# Patient Record
Sex: MALE | Race: BLACK OR AFRICAN AMERICAN | NOT HISPANIC OR LATINO | Employment: OTHER | URBAN - METROPOLITAN AREA
[De-identification: names, ages, dates, MRNs, and addresses within clinical notes are randomized per-mention and may not be internally consistent; named-entity substitution may affect disease eponyms.]

---

## 2019-07-17 PROBLEM — Z09 POSTOP CHECK: Status: ACTIVE | Noted: 2019-07-17

## 2019-10-21 PROBLEM — Z09 POSTOP CHECK: Status: RESOLVED | Noted: 2019-07-17 | Resolved: 2019-10-21

## 2021-07-12 ENCOUNTER — OFFICE VISIT (OUTPATIENT)
Dept: SURGERY | Facility: CLINIC | Age: 56
End: 2021-07-12
Payer: MEDICARE

## 2021-07-12 VITALS — HEIGHT: 75 IN | WEIGHT: 193 LBS | RESPIRATION RATE: 18 BRPM | BODY MASS INDEX: 24 KG/M2

## 2021-07-12 DIAGNOSIS — K43.0 RECURRENT INCISIONAL HERNIA WITH INCARCERATION: ICD-10-CM

## 2021-07-12 DIAGNOSIS — Z01.818 PRE-OP EVALUATION: Primary | ICD-10-CM

## 2021-07-12 PROCEDURE — 3008F BODY MASS INDEX DOCD: CPT | Mod: CPTII,S$GLB,, | Performed by: SURGERY

## 2021-07-12 PROCEDURE — 1125F PR PAIN SEVERITY QUANTIFIED, PAIN PRESENT: ICD-10-PCS | Mod: S$GLB,,, | Performed by: SURGERY

## 2021-07-12 PROCEDURE — 3008F PR BODY MASS INDEX (BMI) DOCUMENTED: ICD-10-PCS | Mod: CPTII,S$GLB,, | Performed by: SURGERY

## 2021-07-12 PROCEDURE — 1125F AMNT PAIN NOTED PAIN PRSNT: CPT | Mod: S$GLB,,, | Performed by: SURGERY

## 2021-07-12 PROCEDURE — 99214 OFFICE O/P EST MOD 30 MIN: CPT | Mod: S$GLB,,, | Performed by: SURGERY

## 2021-07-12 PROCEDURE — 99214 PR OFFICE/OUTPT VISIT, EST, LEVL IV, 30-39 MIN: ICD-10-PCS | Mod: S$GLB,,, | Performed by: SURGERY

## 2021-07-12 RX ORDER — HYDROCODONE BITARTRATE AND ACETAMINOPHEN 10; 325 MG/1; MG/1
TABLET ORAL
COMMUNITY
Start: 2021-07-03 | End: 2023-10-18

## 2021-08-04 ENCOUNTER — TELEPHONE (OUTPATIENT)
Dept: SURGERY | Facility: CLINIC | Age: 56
End: 2021-08-04

## 2021-09-10 PROBLEM — Z90.49 S/P PROCTOCOLECTOMY: Status: ACTIVE | Noted: 2021-09-10

## 2021-09-10 PROBLEM — K90.9 DIARRHEA DUE TO MALABSORPTION: Status: ACTIVE | Noted: 2021-09-10

## 2021-09-10 PROBLEM — R19.7 DIARRHEA DUE TO MALABSORPTION: Status: ACTIVE | Noted: 2021-09-10

## 2021-09-10 PROBLEM — Z87.19 HISTORY OF ULCERATIVE COLITIS: Status: ACTIVE | Noted: 2021-09-10

## 2021-10-12 ENCOUNTER — TELEPHONE (OUTPATIENT)
Dept: SURGERY | Facility: CLINIC | Age: 56
End: 2021-10-12

## 2021-11-11 ENCOUNTER — OFFICE VISIT (OUTPATIENT)
Dept: SURGERY | Facility: CLINIC | Age: 56
End: 2021-11-11
Payer: MEDICARE

## 2021-11-11 VITALS — HEIGHT: 75 IN | WEIGHT: 202 LBS | BODY MASS INDEX: 25.12 KG/M2 | RESPIRATION RATE: 18 BRPM

## 2021-11-11 DIAGNOSIS — K43.0 RECURRENT INCISIONAL HERNIA WITH INCARCERATION: Primary | ICD-10-CM

## 2021-11-11 PROCEDURE — 99213 PR OFFICE/OUTPT VISIT, EST, LEVL III, 20-29 MIN: ICD-10-PCS | Mod: S$GLB,,, | Performed by: SURGERY

## 2021-11-11 PROCEDURE — 3008F PR BODY MASS INDEX (BMI) DOCUMENTED: ICD-10-PCS | Mod: CPTII,S$GLB,, | Performed by: SURGERY

## 2021-11-11 PROCEDURE — 1160F RVW MEDS BY RX/DR IN RCRD: CPT | Mod: CPTII,S$GLB,, | Performed by: SURGERY

## 2021-11-11 PROCEDURE — 99213 OFFICE O/P EST LOW 20 MIN: CPT | Mod: S$GLB,,, | Performed by: SURGERY

## 2021-11-11 PROCEDURE — 1159F PR MEDICATION LIST DOCUMENTED IN MEDICAL RECORD: ICD-10-PCS | Mod: CPTII,S$GLB,, | Performed by: SURGERY

## 2021-11-11 PROCEDURE — 3008F BODY MASS INDEX DOCD: CPT | Mod: CPTII,S$GLB,, | Performed by: SURGERY

## 2021-11-11 PROCEDURE — 1159F MED LIST DOCD IN RCRD: CPT | Mod: CPTII,S$GLB,, | Performed by: SURGERY

## 2021-11-11 PROCEDURE — 1160F PR REVIEW ALL MEDS BY PRESCRIBER/CLIN PHARMACIST DOCUMENTED: ICD-10-PCS | Mod: CPTII,S$GLB,, | Performed by: SURGERY

## 2021-11-16 ENCOUNTER — OUTSIDE PLACE OF SERVICE (OUTPATIENT)
Dept: SURGERY | Facility: CLINIC | Age: 56
End: 2021-11-16
Payer: MEDICARE

## 2021-11-16 PROCEDURE — 49652 PR LAP VENTRAL/UMBILICAL HERNIA; REDUCIBLE: ICD-10-PCS | Mod: ,,, | Performed by: SURGERY

## 2021-11-16 PROCEDURE — 49652 PR LAP VENTRAL/UMBILICAL HERNIA; REDUCIBLE: CPT | Mod: ,,, | Performed by: SURGERY

## 2021-11-17 LAB
ABS NRBC COUNT: 0 THOU/UL (ref 0–0.01)
ABSOLUTE BASOPHIL: 0 10*3/UL (ref 0–0.3)
ABSOLUTE EOSINOPHIL: 0.3 10*3/UL (ref 0–0.6)
ABSOLUTE IMMATURE GRAN: 0.02 THOU/UL (ref 0–0.03)
ABSOLUTE LYMPHOCYTE: 1.4 10*3/UL (ref 1.2–4)
ABSOLUTE MONOCYTE: 0.5 10*3/UL (ref 0.1–0.8)
BASOPHILS NFR BLD: 0.3 % (ref 0–3)
EOSINOPHIL NFR BLD: 4.9 % (ref 0–6)
ERYTHROCYTE [DISTWIDTH] IN BLOOD BY AUTOMATED COUNT: 13.9 % (ref 0–15.5)
HCT VFR BLD AUTO: 38.7 % (ref 42–52)
HGB BLD-MCNC: 12.9 G/DL (ref 14–18)
IMMATURE GRANULOCYTES: 0.3 % (ref 0–0.43)
LYMPHOCYTES NFR BLD: 21.5 % (ref 20–45)
MCH RBC QN AUTO: 32.7 PG (ref 27–32)
MCHC RBC AUTO-ENTMCNC: 33.3 % (ref 32–36)
MCV RBC AUTO: 98 FL (ref 80–97)
MONOCYTES NFR BLD: 8.2 % (ref 2–10)
NEUTROPHILS # BLD AUTO: 4.1 10*3/UL (ref 1.4–7)
NEUTROPHILS NFR BLD: 64.8 % (ref 50–80)
NUCLEATED RED BLOOD CELLS: 0 % (ref 0–0.2)
PLATELETS: 153 10*3/UL (ref 130–400)
PMV BLD AUTO: 11.6 FL (ref 9.2–12.2)
RBC # BLD AUTO: 3.95 10*6/UL (ref 4.7–6.1)
WBC # BLD: 6.3 10*3/UL (ref 4.5–10)

## 2021-11-23 ENCOUNTER — TELEPHONE (OUTPATIENT)
Dept: SURGERY | Facility: CLINIC | Age: 56
End: 2021-11-23
Payer: MEDICARE

## 2021-11-29 ENCOUNTER — OFFICE VISIT (OUTPATIENT)
Dept: SURGERY | Facility: CLINIC | Age: 56
End: 2021-11-29
Payer: MEDICARE

## 2021-11-29 DIAGNOSIS — R10.9 ABDOMINAL PAIN, UNSPECIFIED ABDOMINAL LOCATION: ICD-10-CM

## 2021-11-29 DIAGNOSIS — K43.0 RECURRENT INCISIONAL HERNIA WITH INCARCERATION: Primary | ICD-10-CM

## 2021-11-29 PROCEDURE — 99024 PR POST-OP FOLLOW-UP VISIT: ICD-10-PCS | Mod: S$GLB,,, | Performed by: SURGERY

## 2021-11-29 PROCEDURE — 99024 POSTOP FOLLOW-UP VISIT: CPT | Mod: S$GLB,,, | Performed by: SURGERY

## 2021-11-29 RX ORDER — OXYCODONE AND ACETAMINOPHEN 5; 325 MG/1; MG/1
1 TABLET ORAL EVERY 4 HOURS PRN
Qty: 30 TABLET | Refills: 0 | Status: CANCELLED | OUTPATIENT
Start: 2021-11-29

## 2021-12-13 ENCOUNTER — OFFICE VISIT (OUTPATIENT)
Dept: SURGERY | Facility: CLINIC | Age: 56
End: 2021-12-13
Payer: MEDICARE

## 2021-12-13 VITALS — BODY MASS INDEX: 24 KG/M2 | RESPIRATION RATE: 18 BRPM | HEIGHT: 75 IN | WEIGHT: 193 LBS

## 2021-12-13 DIAGNOSIS — K43.0 RECURRENT INCISIONAL HERNIA WITH INCARCERATION: Primary | ICD-10-CM

## 2021-12-13 PROCEDURE — 99024 PR POST-OP FOLLOW-UP VISIT: ICD-10-PCS | Mod: S$GLB,,, | Performed by: SURGERY

## 2021-12-13 PROCEDURE — 99024 POSTOP FOLLOW-UP VISIT: CPT | Mod: S$GLB,,, | Performed by: SURGERY

## 2022-03-23 ENCOUNTER — OFFICE VISIT (OUTPATIENT)
Dept: SURGERY | Facility: CLINIC | Age: 57
End: 2022-03-23
Payer: MEDICARE

## 2022-03-23 DIAGNOSIS — R10.9 ABDOMINAL PAIN, UNSPECIFIED ABDOMINAL LOCATION: Primary | ICD-10-CM

## 2022-03-23 PROCEDURE — 1160F RVW MEDS BY RX/DR IN RCRD: CPT | Mod: CPTII,S$GLB,, | Performed by: SURGERY

## 2022-03-23 PROCEDURE — 1159F MED LIST DOCD IN RCRD: CPT | Mod: CPTII,S$GLB,, | Performed by: SURGERY

## 2022-03-23 PROCEDURE — 99213 OFFICE O/P EST LOW 20 MIN: CPT | Mod: S$GLB,,, | Performed by: SURGERY

## 2022-03-23 PROCEDURE — 99213 PR OFFICE/OUTPT VISIT, EST, LEVL III, 20-29 MIN: ICD-10-PCS | Mod: S$GLB,,, | Performed by: SURGERY

## 2022-03-23 PROCEDURE — 1160F PR REVIEW ALL MEDS BY PRESCRIBER/CLIN PHARMACIST DOCUMENTED: ICD-10-PCS | Mod: CPTII,S$GLB,, | Performed by: SURGERY

## 2022-03-23 PROCEDURE — 1159F PR MEDICATION LIST DOCUMENTED IN MEDICAL RECORD: ICD-10-PCS | Mod: CPTII,S$GLB,, | Performed by: SURGERY

## 2022-03-23 RX ORDER — PREGABALIN 150 MG/1
150 CAPSULE ORAL 3 TIMES DAILY
COMMUNITY
Start: 2022-02-28

## 2022-03-23 NOTE — PROGRESS NOTES
Subjective:       Patient ID: Roger Ge is a 57 y.o. male.    Chief Complaint: Abdominal Pain      This is a 57-year-old male who is known to me, patient had a robotic recurrent ventral hernia repair performed approximately 4 months ago returns with generalized abdominal pain.  States he is having pain is periumbilical area near the repair as well in his right and left lower quadrant, also complaining of profuse diarrhea.  Patient states he has been having symptoms for several weeks.    Review of Systems   Constitutional: Negative for chills, fatigue and fever.   HENT: Negative for nasal congestion and rhinorrhea.    Respiratory: Negative for shortness of breath and wheezing.    Cardiovascular: Negative for chest pain and palpitations.   Gastrointestinal: Negative for abdominal pain, blood in stool, diarrhea, nausea and vomiting.   Endocrine: Negative for cold intolerance and heat intolerance.   Genitourinary: Negative for difficulty urinating.   Musculoskeletal: Negative for joint swelling and myalgias.   Integumentary:  Negative for rash and wound.   Neurological: Negative for weakness, light-headedness and numbness.   Psychiatric/Behavioral: Negative for agitation and confusion.         Objective:      Physical Exam  Vitals reviewed.   Constitutional:       Appearance: He is well-developed.   HENT:      Head: Normocephalic and atraumatic.   Eyes:      Conjunctiva/sclera: Conjunctivae normal.   Neck:      Trachea: Trachea normal.   Cardiovascular:      Rate and Rhythm: Normal rate and regular rhythm.   Pulmonary:      Effort: Pulmonary effort is normal.      Breath sounds: Normal breath sounds.   Abdominal:      General: There is no distension.      Palpations: Abdomen is soft.      Tenderness: There is no abdominal tenderness. There is no guarding.      Hernia: No hernia is present.   Musculoskeletal:         General: Normal range of motion.      Cervical back: Normal range of motion.   Skin:     General:  Skin is warm and dry.   Neurological:      Mental Status: He is alert and oriented to person, place, and time.   Psychiatric:         Speech: Speech normal.         Behavior: Behavior normal.         Assessment:       Problem List Items Addressed This Visit    None         Plan:       57-year-old male with generalized abdominal pain, diarrhea, no recurrence noted on physical examination, will order CT scan of the abdomen pelvis to better evaluate possible source of the patient's abdominal pain.

## 2023-04-17 ENCOUNTER — OFFICE VISIT (OUTPATIENT)
Dept: SURGERY | Facility: CLINIC | Age: 58
End: 2023-04-17
Payer: MEDICARE

## 2023-04-17 DIAGNOSIS — R19.7 DIARRHEA, UNSPECIFIED TYPE: ICD-10-CM

## 2023-04-17 DIAGNOSIS — R10.9 ABDOMINAL PAIN, UNSPECIFIED ABDOMINAL LOCATION: Primary | ICD-10-CM

## 2023-04-17 PROCEDURE — 1160F PR REVIEW ALL MEDS BY PRESCRIBER/CLIN PHARMACIST DOCUMENTED: ICD-10-PCS | Mod: CPTII,S$GLB,, | Performed by: SURGERY

## 2023-04-17 PROCEDURE — 99214 PR OFFICE/OUTPT VISIT, EST, LEVL IV, 30-39 MIN: ICD-10-PCS | Mod: S$GLB,,, | Performed by: SURGERY

## 2023-04-17 PROCEDURE — 99214 OFFICE O/P EST MOD 30 MIN: CPT | Mod: S$GLB,,, | Performed by: SURGERY

## 2023-04-17 PROCEDURE — 1159F MED LIST DOCD IN RCRD: CPT | Mod: CPTII,S$GLB,, | Performed by: SURGERY

## 2023-04-17 PROCEDURE — 1160F RVW MEDS BY RX/DR IN RCRD: CPT | Mod: CPTII,S$GLB,, | Performed by: SURGERY

## 2023-04-17 PROCEDURE — 1159F PR MEDICATION LIST DOCUMENTED IN MEDICAL RECORD: ICD-10-PCS | Mod: CPTII,S$GLB,, | Performed by: SURGERY

## 2023-04-17 NOTE — PROGRESS NOTES
Subjective:       Patient ID: Roger Ge is a 58 y.o. male.    Chief Complaint: Abdominal Pain and Diarrhea      Patient is still having generalized abdominal pain and diarrhea, saw the Gastroenterology team and set up for a colonoscopy in the a.m..    Review of Systems   Constitutional:  Negative for chills, fatigue and fever.   HENT:  Negative for nasal congestion and rhinorrhea.    Respiratory:  Negative for shortness of breath and wheezing.    Cardiovascular:  Negative for chest pain and palpitations.   Gastrointestinal:  Negative for abdominal pain, blood in stool, diarrhea, nausea and vomiting.   Endocrine: Negative for cold intolerance and heat intolerance.   Genitourinary:  Negative for difficulty urinating.   Musculoskeletal:  Negative for joint swelling and myalgias.   Integumentary:  Negative for rash and wound.   Neurological:  Negative for weakness, light-headedness and numbness.   Psychiatric/Behavioral:  Negative for agitation and confusion.        Objective:      Physical Exam  Vitals reviewed.   Constitutional:       Appearance: He is well-developed.   HENT:      Head: Normocephalic and atraumatic.   Eyes:      Conjunctiva/sclera: Conjunctivae normal.   Neck:      Trachea: Trachea normal.   Cardiovascular:      Rate and Rhythm: Normal rate and regular rhythm.   Pulmonary:      Effort: Pulmonary effort is normal.      Breath sounds: Normal breath sounds.   Abdominal:      General: There is no distension.      Palpations: Abdomen is soft.      Tenderness: There is no abdominal tenderness. There is no guarding.      Hernia: No hernia is present.   Musculoskeletal:         General: Normal range of motion.      Cervical back: Normal range of motion.   Skin:     General: Skin is warm and dry.   Neurological:      Mental Status: He is alert and oriented to person, place, and time.   Psychiatric:         Speech: Speech normal.         Behavior: Behavior normal.       Assessment:       Problem List Items  Addressed This Visit    None  Visit Diagnoses       Abdominal pain, unspecified abdominal location    -  Primary    Diarrhea, unspecified type                Plan:       Instructed patient to follow through with his colonoscopy in follow-up in 2 weeks discuss results, not entirely sure there is anything from a general surgery standpoint that I can help patient with it this time.

## 2023-09-06 DIAGNOSIS — M54.16 LUMBAR RADICULOPATHY: Primary | ICD-10-CM

## 2023-09-29 ENCOUNTER — TELEPHONE (OUTPATIENT)
Dept: PAIN MEDICINE | Facility: CLINIC | Age: 58
End: 2023-09-29
Payer: MEDICARE

## 2023-09-29 NOTE — TELEPHONE ENCOUNTER
----- Message from Nicole Osuna MD sent at 9/19/2023 12:18 PM CDT -----  Please call the patient and explain:       1) We are an interventional pain clinic that works with our patients to find the safest, most effective solutions for their pain.     2) We employ a multidisciplinary approach that maximizes function and minimizes narcotic usage.     3) If he would like an assessment for non-narcotic management like injections, physical therapy, and non-opioid medications, we are more than happy to see evaluate for this.   4) If he prefers to focus on opioid therapy, he should seek an evaluation with a different pain clinic.     Thank you,  Nicole Osuna MD  Interventional Pain Medicine

## 2023-09-29 NOTE — TELEPHONE ENCOUNTER
Called pt to notify of expectations of Interventional Pain Management, no answer. No voicemail set-up.

## 2023-10-18 ENCOUNTER — OFFICE VISIT (OUTPATIENT)
Dept: PAIN MEDICINE | Facility: CLINIC | Age: 58
End: 2023-10-18
Payer: MEDICARE

## 2023-10-18 VITALS
HEIGHT: 75 IN | HEART RATE: 56 BPM | BODY MASS INDEX: 21.51 KG/M2 | SYSTOLIC BLOOD PRESSURE: 132 MMHG | DIASTOLIC BLOOD PRESSURE: 78 MMHG | OXYGEN SATURATION: 98 % | WEIGHT: 173 LBS

## 2023-10-18 DIAGNOSIS — M47.816 LUMBAR SPONDYLOSIS: ICD-10-CM

## 2023-10-18 DIAGNOSIS — F32.A DEPRESSION, UNSPECIFIED DEPRESSION TYPE: ICD-10-CM

## 2023-10-18 DIAGNOSIS — R29.898 BILATERAL LEG WEAKNESS: ICD-10-CM

## 2023-10-18 DIAGNOSIS — M53.86 DECREASED RANGE OF MOTION OF LUMBAR SPINE: ICD-10-CM

## 2023-10-18 DIAGNOSIS — M51.26 HNP (HERNIATED NUCLEUS PULPOSUS), LUMBAR: ICD-10-CM

## 2023-10-18 DIAGNOSIS — Z78.9 POOR HISTORIAN: ICD-10-CM

## 2023-10-18 DIAGNOSIS — M54.16 LUMBAR RADICULOPATHY: ICD-10-CM

## 2023-10-18 DIAGNOSIS — Z87.19 HISTORY OF ULCERATIVE COLITIS: Primary | ICD-10-CM

## 2023-10-18 PROBLEM — K21.9 GASTROESOPHAGEAL REFLUX DISEASE: Status: ACTIVE | Noted: 2018-03-28

## 2023-10-18 PROBLEM — I10 ESSENTIAL HYPERTENSION: Status: ACTIVE | Noted: 2017-03-02

## 2023-10-18 PROBLEM — M54.9 CHRONIC BACK PAIN: Status: ACTIVE | Noted: 2023-10-18

## 2023-10-18 PROBLEM — E78.5 DYSLIPIDEMIA: Status: ACTIVE | Noted: 2017-03-02

## 2023-10-18 PROBLEM — R10.9 ABDOMINAL PAIN: Status: ACTIVE | Noted: 2018-06-28

## 2023-10-18 PROBLEM — G89.29 CHRONIC BACK PAIN: Status: ACTIVE | Noted: 2023-10-18

## 2023-10-18 PROCEDURE — 1159F PR MEDICATION LIST DOCUMENTED IN MEDICAL RECORD: ICD-10-PCS | Mod: CPTII,S$GLB,, | Performed by: PHYSICAL MEDICINE & REHABILITATION

## 2023-10-18 PROCEDURE — 99205 PR OFFICE/OUTPT VISIT, NEW, LEVL V, 60-74 MIN: ICD-10-PCS | Mod: S$GLB,,, | Performed by: PHYSICAL MEDICINE & REHABILITATION

## 2023-10-18 PROCEDURE — 1160F PR REVIEW ALL MEDS BY PRESCRIBER/CLIN PHARMACIST DOCUMENTED: ICD-10-PCS | Mod: CPTII,S$GLB,, | Performed by: PHYSICAL MEDICINE & REHABILITATION

## 2023-10-18 PROCEDURE — 3075F PR MOST RECENT SYSTOLIC BLOOD PRESS GE 130-139MM HG: ICD-10-PCS | Mod: CPTII,S$GLB,, | Performed by: PHYSICAL MEDICINE & REHABILITATION

## 2023-10-18 PROCEDURE — 3078F PR MOST RECENT DIASTOLIC BLOOD PRESSURE < 80 MM HG: ICD-10-PCS | Mod: CPTII,S$GLB,, | Performed by: PHYSICAL MEDICINE & REHABILITATION

## 2023-10-18 PROCEDURE — 1159F MED LIST DOCD IN RCRD: CPT | Mod: CPTII,S$GLB,, | Performed by: PHYSICAL MEDICINE & REHABILITATION

## 2023-10-18 PROCEDURE — 3075F SYST BP GE 130 - 139MM HG: CPT | Mod: CPTII,S$GLB,, | Performed by: PHYSICAL MEDICINE & REHABILITATION

## 2023-10-18 PROCEDURE — 1160F RVW MEDS BY RX/DR IN RCRD: CPT | Mod: CPTII,S$GLB,, | Performed by: PHYSICAL MEDICINE & REHABILITATION

## 2023-10-18 PROCEDURE — 3008F BODY MASS INDEX DOCD: CPT | Mod: CPTII,S$GLB,, | Performed by: PHYSICAL MEDICINE & REHABILITATION

## 2023-10-18 PROCEDURE — 3078F DIAST BP <80 MM HG: CPT | Mod: CPTII,S$GLB,, | Performed by: PHYSICAL MEDICINE & REHABILITATION

## 2023-10-18 PROCEDURE — 99205 OFFICE O/P NEW HI 60 MIN: CPT | Mod: S$GLB,,, | Performed by: PHYSICAL MEDICINE & REHABILITATION

## 2023-10-18 PROCEDURE — 3008F PR BODY MASS INDEX (BMI) DOCUMENTED: ICD-10-PCS | Mod: CPTII,S$GLB,, | Performed by: PHYSICAL MEDICINE & REHABILITATION

## 2023-10-18 NOTE — PROGRESS NOTES
[facility]      Referring Provider: Adryan Jensen Md  [address]  [address]  [place] [address]    Chief Complaint:   Chief Complaint   Patient presents with    Low-back Pain    Joint Pain       History of Present Illness: Roger Ge is a 58 y.o. male referred by Dr. Adryan Jensen for lower back and joint pain.      Onset: 2006, insidious onset but worked hard his whole life  Location: bilateral lower back, worse on the right   Radiation: Right leg across the anterior knee into the right foot.   Timing: constant  Quality: Throbbing, Pounding, Burning, Tingling, Pins/Needles, Sharp, and Stabbing  Exacerbating Factors: exercise, running, walking, standing, sitting, lying down, lifting, twisting, and turning  Alleviating Factors: nothing  Associated Symptoms: He has weakness and numbness in the right leg. He has incontinence of bowels, had ilial pouch created for ulcerative colitis. He has incontinence of his bladder as well. He is losing weight. He denies night fever/night sweats    He tried to have home health therapy, but pain was too severe to be able to do it. He says he takes gabapentin and lyrica, but last filled July 2023 and PCP notes he stopped taking both. PCP note states he is using EtOH for pain.     Severity: Currently: 8/10   Typical Range: 8-9/10     Exacerbation: 10/10     P = 8  E = 10  G = 10  Baseline PEG Score = 9.33    Current PEG Score: 9.33    Opioid Risk Score         Value Time User    Opioid Risk Score  1 10/18/2023  8:44 AM Leti Arreaga MA             Previous Interventions:  -     Previous Therapies:  PT/OT: no   Relevant Surgery: no   Previous Medications:   - NSAIDS: diclofenac. Naproxen. Mobic. Ibuprofen. (Listed allergy to tylenol, but Rx-ed Hydrocodone-APAP several times in past per )  - Muscle Relaxants: Flexeril   - TCAs:   - SNRIs:   - Topicals:   - Anticonvulsants: gabapentin. Pregabalin.    - Opioids:     Current Pain  Medications:  He is unclear what he is taking      Blood Thinners: ASA 81 mg     Full Medication List:    Current Outpatient Medications:     aspirin (ECOTRIN) 81 MG EC tablet, 1 tablet, Disp: , Rfl:     fluticasone propionate (FLONASE) 50 mcg/actuation nasal spray, 2 sprays by Each Nostril route once daily., Disp: , Rfl:     gabapentin (NEURONTIN) 600 MG tablet, , Disp: , Rfl:     pregabalin (LYRICA) 150 MG capsule, Take 150 mg by mouth 3 (three) times daily., Disp: , Rfl:     amLODIPine (NORVASC) 5 MG tablet, Take 5 mg by mouth., Disp: , Rfl:     atorvastatin (LIPITOR) 80 MG tablet, 1 tablet, Disp: , Rfl:     BELSOMRA 20 mg Tab, Take by mouth., Disp: , Rfl:     cloNIDine (CATAPRES) 0.1 MG tablet, Take 0.1 mg by mouth every 4 (four) hours as needed., Disp: , Rfl:     cyclobenzaprine (FLEXERIL) 10 MG tablet, SMARTSI Tablet(s) By Mouth 3 Times Daily PRN, Disp: , Rfl:     dicyclomine (BENTYL) 20 mg tablet, , Disp: , Rfl:     diphenhydrAMINE (BENADRYL) 25 mg capsule, Take 25 mg by mouth every 6 (six) hours as needed., Disp: , Rfl:     EScitalopram oxalate (LEXAPRO) 10 MG tablet, Take 10 mg by mouth., Disp: , Rfl:     famotidine (PEPCID) 20 MG tablet, Take 40 mg by mouth., Disp: , Rfl:     haloperidoL (HALDOL) 2 MG tablet, Take 2 mg by mouth every 6 (six) hours as needed., Disp: , Rfl:     loperamide (IMODIUM) 2 mg capsule, SMARTSI Capsule(s) By Mouth Every 2 Hours PRN, Disp: , Rfl:     MAG-AL PLUS EXTRA STRENGTH 400-400-40 mg/5 mL suspension, SMARTSI Milliliter(s) By Mouth Every 4 Hours PRN, Disp: , Rfl:     methocarbamoL (ROBAXIN) 500 MG Tab, Take 500 mg by mouth every 8 (eight) hours., Disp: , Rfl:     methylPREDNISolone (MEDROL DOSEPACK) 4 mg tablet, Take by mouth., Disp: , Rfl:     MILK OF MAGNESIA 400 mg/5 mL suspension, SMARTSI Milliliter(s) By Mouth Daily PRN, Disp: , Rfl:     nitroGLYCERIN (NITROSTAT) 0.4 MG SL tablet, , Disp: , Rfl:     omeprazole (PRILOSEC) 40 MG capsule, 1 capsule, Disp: , Rfl:  "    ondansetron (ZOFRAN-ODT) 4 MG TbDL, Take 4 mg by mouth every 4 (four) hours as needed., Disp: , Rfl:     predniSONE (DELTASONE) 20 MG tablet, Take by mouth., Disp: , Rfl:     promethazine-dextromethorphan (PROMETHAZINE-DM) 6.25-15 mg/5 mL Syrp, TAKE 5ml BY MOUTH EVERY 4 TO 6 HOURS AS NEEDED FOR COUGH, Disp: , Rfl:     traZODone (DESYREL) 50 MG tablet, Take 25 mg by mouth nightly as needed., Disp: , Rfl:     VISTARIL 50 mg Cap, Take 50 mg by mouth every 6 (six) hours as needed., Disp: , Rfl:      Review of Systems: See HPI    Allergies:  Orange and Tomato     Medical History:   has a past medical history of Worthy's esophagus, Bowel and bladder incontinence, CKD (chronic kidney disease), Coronary artery disease, Diabetes mellitus type I, Drug-seeking behavior, GERD (gastroesophageal reflux disease), Hernia of abdominal wall, History of coronary angioplasty with insertion of stent, History of ST elevation myocardial infarction (STEMI), Hyperlipidemia, Hypertension, Ileal pouchitis, Insomnia, Onychomycosis, Right knee pain, Sleep apnea, unspecified, Smoker, and Ventral hernia.    Surgical History:   has a past surgical history that includes heart stents; Colonoscopy; Colonoscopy (N/A, 08/06/2020); Robotic Ventral Hernia Repair (x2), Extensive Lysis of Dense Adhesions; Colostomy; and Laparoscopic proctocolectomy with ileoanal anastomosis, creation of ileal pouch, and ileostomy.    Family History:  family history includes Arthritis in his mother; Diabetes in his mother; Heart disease in his mother; Hypertension in his father and mother; Stroke in his father.    Social History:   reports that he quit smoking about 17 years ago. His smoking use included cigarettes. He has never used smokeless tobacco. He reports current alcohol use. He reports current drug use. Drug: Marijuana.    Physical Exam:  /78   Pulse (!) 56   Ht 6' 3" (1.905 m)   Wt 78.5 kg (173 lb)   SpO2 98%   BMI 21.62 kg/m²   GEN: No acute " distress. Calm, comfortable  HENT: Normocephalic, atraumatic, moist mucous membranes  EYE: Anicteric sclera, non-injected.   CV: Non-diaphoretic. Regular Rate. Radial Pulses 2+.  RESP: Breathing comfortably. Chest expansion symmetric.  EXT: No clubbing, cyanosis.   SKIN: Warm, & dry to palpation. No visible rashes or lesions of exposed skin.   PSYCH: Pleasant mood and appropriate affect. Recent and remote memory intact.   GAIT: Independent, normal ambulation  Lumbar Spine Exam:       Inspection: No erythema, bruising.       Palpation: (+) TTP of lumbar paraspinals diffuse and b/l and SIJ b/l       ROM:  Limited in flexion, extension, lateral bending.       (+) Facet loading bilaterally      (+) Straight Leg Raise on the right      (+) MIKIE bilaterally  Hip Exam:      Inspection: No gross deformity or apparent leg length discrepancy      Palpation: (+) TTP to right greater trochanteric bursa.       ROM:  No limitation or pain in internal rotation, external rotation b/l  Neurologic Exam: (+) Suzette's signs with pain recreated and anticipated even prior to reflex testing     Alert. Speech is fluent and appropriate.     Strength:  4/5 throughout bilateral lower extremities     Sensation:  Grossly intact to light touch in bilateral lower extremities     Reflexes: Absent (patient unable to relax) in b/l patella, achilles     Tone: No abnormality appreciated in bilateral lower extremities     No Clonus     Downgoing toes on plantar stimulation           Imaging:  - MRI Lumbar spine w/o 8/25/23:  Lumbar vertebrae heights and alignment appear maintained. There is no suspicious marrow signal. Minimal retrolisthesis noted of L3 on L4. Conus and cauda equina are without acute finding.  T12-L1: No spinal stenosis or foraminal narrowing.  L1-2: No significant canal or foraminal stenosis.  L2-3: Disc desiccation and diffuse disc bulge without spinal stenosis. Moderate facet DJD without foraminal narrowing.  L3-4: Disc  "desiccation and diffuse disc bulge with mild spinal stenosis. There is lateral recess stenosis. Moderate facet DJD. Mild bilateral foraminal narrowing.  L4-5: Disc desiccation and diffuse disc bulge with moderate spinal stenosis and lateral recess stenosis. Moderate facet DJD. There is a superimposed right foraminal/extra foraminal protrusion resulting in mass effect upon the nerve root. There is moderate right foraminal narrowing. There is mild left foraminal narrowing.  L5-S1: Disc desiccation and diffuse disc bulge with no spinal stenosis. Advanced facet DJD. No spinal stenosis or foraminal narrowing.  Mild SI joint DJD.  IMPRESSION:  1. Moderate lumbar DJD with multilevel spinal stenosis and multilevel foraminal narrowing as described in detail level by level above. Findings are most significant at L4-5.    - CT Lumbar spine 6/26/23:  Alignment of the lumbar spine is within normal limits.  There is no fracture or malalignment. Vertebral body heights are normal.  There are moderate degenerative changes in the mid and lower lumbar spine.  Disc bulges are present at at least L3-L4, L4-L5, and L5-S1.  No obvious canal stenosis. There are probable bilateral foraminal stenoses at L4-L5 and L5-S1, likely also due to L4  The paraspinal soft tissues appear normal.  IMPRESSION:  1. No acute findings  2. Multilevel disc degeneration and hypertrophic facet arthropathy with probable foraminal stenoses bilaterally at L3-L4, L4-L5, and L5-S1      Labs:  BMP  Lab Results   Component Value Date     10/23/2019    K 4.3 10/23/2019     (H) 10/23/2019    CO2 28 10/23/2019    BUN 11 10/23/2019    CREATININE 1.17 10/23/2019    CALCIUM 9.4 10/23/2019    ANIONGAP 3 (L) 10/23/2019     No results found for: "ALT", "AST", "GGT", "ALKPHOS", "BILITOT"  No results found for: "PLT"    Assessment:  Roger Ge is a 58 y.o. male with the following diagnoses based on history, exam, and imaging:    Problem List Items Addressed This " Visit          Neuro    Lumbar radiculopathy    Relevant Orders    Ambulatory referral/consult to Physical/Occupational Therapy    Ambulatory referral to External Surgery    HNP (herniated nucleus pulposus), lumbar    Relevant Orders    Ambulatory referral/consult to Physical/Occupational Therapy    Lumbar spondylosis    Relevant Orders    Ambulatory referral/consult to Physical/Occupational Therapy    Decreased range of motion of lumbar spine       Psychiatric    Depression       GI    History of ulcerative colitis - Primary    Relevant Orders    Ambulatory referral/consult to Gastroenterology       Orthopedic    Bilateral leg weakness     Other Visit Diagnoses       Poor historian                This is a pleasant 58 y.o. gentleman presenting with:     - Chronic bilateral low back pain and right leg pain: Multifactorial pain, but suspect primarily due to HNP at L4 pushing on right L4 nerve root  - Right trochanteric bursitis  - Widespread pain/Polyarthralgia: Potentially related to his IBD, but patient was late to visit today and much of visit spent trying to organize his current medical problems and medications.   - Poor historian: Unclear of his current medications and his specific medical diagnoses/problems. I advised him to try to write down all his current meds, how he is taking them and his other medical problems.   - Comorbidities: Ulcerative colitis. Bowel and bladder incontinence. CAD c/b MI s/p PCI on ASA. HTN. GERD. CKD. Depression.    - ? DM2 ? (On referral note but not taking any meds for DM2).     Treatment Plan:   - PT/OT/HEP: Refer to PT to start after WILBER. Provided HEP. Discussed benefits of exercise for pain.   - Procedures: Schedule right L4 & L5 TF WILBER w/ local/MAC. Okay to cont ASA.   - If limited relief, plan for L4-5 IL WILBER favoring right side.    - If limited relief with both above, plan for caudal WILBER  - Medications:   - DC tizanidine as patient also on clonidine (unclear if he actually  takes clonidine or tizanidine).  - Unclear if he is taking gabapentin or lyrica or both. Referral says gabapentin on med list, but  shows he last filled gabapentin and lyrica 7/11/23.   - Imaging: Reviewed.   - Labs: Reviewed.    - Request records from Dr. Segundo  - Refer to local GI for management of IBD. He is currently traveling to [place].     Follow Up: RTC 2 weeks after WILBER    I spent greater than 60 minutes in total in todays visit including face-to-face time with the patient, and time reviewing records/imaging/labs, and documenting.       Nicole Osuna M.D.  Interventional Pain Medicine / Physical Medicine & Rehabilitation

## 2023-11-01 ENCOUNTER — TELEPHONE (OUTPATIENT)
Dept: GASTROENTEROLOGY | Facility: CLINIC | Age: 58
End: 2023-11-01
Payer: MEDICARE

## 2023-11-06 ENCOUNTER — OUTSIDE PLACE OF SERVICE (OUTPATIENT)
Dept: PAIN MEDICINE | Facility: CLINIC | Age: 58
End: 2023-11-06

## 2023-11-06 LAB — GLUCOSE SERPL-MCNC: 94 MG/DL (ref 70–105)

## 2023-11-06 PROCEDURE — 64484 PRA INJECT ANES/STEROID FORAMEN LUMBAR/SACRAL W IMG GUIDE ,EA ADD LEVEL: ICD-10-PCS | Mod: RT,,, | Performed by: PHYSICAL MEDICINE & REHABILITATION

## 2023-11-06 PROCEDURE — 64484 NJX AA&/STRD TFRM EPI L/S EA: CPT | Mod: RT,,, | Performed by: PHYSICAL MEDICINE & REHABILITATION

## 2023-11-06 PROCEDURE — 64483 NJX AA&/STRD TFRM EPI L/S 1: CPT | Mod: RT,,, | Performed by: PHYSICAL MEDICINE & REHABILITATION

## 2023-11-06 PROCEDURE — 64483 PR EPIDURAL INJ, ANES/STEROID, TRANSFORAMINAL, LUMB/SACR, SNGL LEVL: ICD-10-PCS | Mod: RT,,, | Performed by: PHYSICAL MEDICINE & REHABILITATION

## 2024-01-18 ENCOUNTER — OFFICE VISIT (OUTPATIENT)
Dept: PAIN MEDICINE | Facility: CLINIC | Age: 59
End: 2024-01-18
Payer: MEDICARE

## 2024-01-18 VITALS
DIASTOLIC BLOOD PRESSURE: 82 MMHG | HEIGHT: 75 IN | WEIGHT: 173 LBS | SYSTOLIC BLOOD PRESSURE: 135 MMHG | BODY MASS INDEX: 21.51 KG/M2 | HEART RATE: 71 BPM

## 2024-01-18 DIAGNOSIS — Z87.19 HISTORY OF ULCERATIVE COLITIS: ICD-10-CM

## 2024-01-18 DIAGNOSIS — M54.16 LUMBAR RADICULOPATHY: Primary | ICD-10-CM

## 2024-01-18 DIAGNOSIS — M47.816 LUMBAR SPONDYLOSIS: ICD-10-CM

## 2024-01-18 DIAGNOSIS — M53.86 DECREASED RANGE OF MOTION OF LUMBAR SPINE: ICD-10-CM

## 2024-01-18 PROCEDURE — 3008F BODY MASS INDEX DOCD: CPT | Mod: CPTII,S$GLB,, | Performed by: PHYSICAL MEDICINE & REHABILITATION

## 2024-01-18 PROCEDURE — 1160F RVW MEDS BY RX/DR IN RCRD: CPT | Mod: CPTII,S$GLB,, | Performed by: PHYSICAL MEDICINE & REHABILITATION

## 2024-01-18 PROCEDURE — 3079F DIAST BP 80-89 MM HG: CPT | Mod: CPTII,S$GLB,, | Performed by: PHYSICAL MEDICINE & REHABILITATION

## 2024-01-18 PROCEDURE — 1159F MED LIST DOCD IN RCRD: CPT | Mod: CPTII,S$GLB,, | Performed by: PHYSICAL MEDICINE & REHABILITATION

## 2024-01-18 PROCEDURE — 3075F SYST BP GE 130 - 139MM HG: CPT | Mod: CPTII,S$GLB,, | Performed by: PHYSICAL MEDICINE & REHABILITATION

## 2024-01-18 PROCEDURE — 99214 OFFICE O/P EST MOD 30 MIN: CPT | Mod: S$GLB,,, | Performed by: PHYSICAL MEDICINE & REHABILITATION

## 2024-01-18 NOTE — PROGRESS NOTES
"[facility]        Chief Complaint:   Chief Complaint   Patient presents with    Low-back Pain       History of Present Illness: Roger Ge is a 58 y.o. male referred by Dr. Adryan Jensen for lower back and joint pain.      Onset: 2006, insidious onset but worked hard his whole life  Location: bilateral lower back, worse on the right   Radiation: Right leg across the anterior knee into the right foot.   Timing: constant  Quality: Throbbing, Pounding, Burning, Tingling, Pins/Needles, Sharp, and Stabbing  Exacerbating Factors: exercise, running, walking, standing, sitting, lying down, lifting, twisting, and turning  Alleviating Factors: nothing  Associated Symptoms: He has weakness and numbness in the right leg. He has incontinence of bowels, had ilial pouch created for ulcerative colitis. He has incontinence of his bladder as well. He is losing weight. He denies night fever/night sweats    He tried to have home health therapy, but pain was too severe to be able to do it. He says he takes gabapentin and lyrica, but last filled July 2023 and PCP notes he stopped taking both. PCP note states he is using EtOH for pain.     Severity: Currently: 8/10   Typical Range: 8-9/10     Exacerbation: 10/10     P = 8  E = 10  G = 10  Baseline PEG Score = 9.33    Interval History (01/18/2024):  Roger Ge returns today for follow up.  At the last clinic visit, scheduled right L4 & L5 TF WILBER, DC tizanidine, refer to PT after WILBER.     Right L4 & L5 TF WILBER  provided 0% relief. He did not do PT because no one called him, but he persists that he cannot do it due to the pain. He attempts home exercises that were given, but cannot do that either due to pain.     Currently, the lower back and right leg pain is worse.      Patient unsure of what medications he is currently taking. He does state that Dr. Goel gave him "something for anxiety and something for muscle spasms" last week. Advised pt to call office this " afternoon to verify medication list.     Current Pain Scales:  Current: 8/10              Typical Range: 8-10/10     Current PEG Score: 9.67    Opioid Risk Score         Value Time User    Opioid Risk Score  1 10/18/2023  8:44 AM Leti Arreaga MA             Previous Interventions:  -     Previous Therapies:  PT/OT: no   Relevant Surgery: no   Previous Medications:   - NSAIDS: diclofenac. Naproxen. Mobic. Ibuprofen. (Listed allergy to tylenol, but Rx-ed Hydrocodone-APAP several times in past per )  - Muscle Relaxants: Flexeril   - TCAs:   - SNRIs:   - Topicals:   - Anticonvulsants: gabapentin. Pregabalin.    - Opioids:     Current Pain Medications:  He is unclear what he is taking      Blood Thinners: ASA 81 mg     Full Medication List:    Current Outpatient Medications:     amLODIPine (NORVASC) 5 MG tablet, Take 5 mg by mouth., Disp: , Rfl:     aspirin (ECOTRIN) 81 MG EC tablet, 1 tablet, Disp: , Rfl:     atorvastatin (LIPITOR) 80 MG tablet, 1 tablet, Disp: , Rfl:     BELSOMRA 20 mg Tab, Take by mouth., Disp: , Rfl:     cloNIDine (CATAPRES) 0.1 MG tablet, Take 0.1 mg by mouth every 4 (four) hours as needed., Disp: , Rfl:     cyclobenzaprine (FLEXERIL) 10 MG tablet, SMARTSI Tablet(s) By Mouth 3 Times Daily PRN, Disp: , Rfl:     dicyclomine (BENTYL) 20 mg tablet, , Disp: , Rfl:     diphenhydrAMINE (BENADRYL) 25 mg capsule, Take 25 mg by mouth every 6 (six) hours as needed., Disp: , Rfl:     EScitalopram oxalate (LEXAPRO) 10 MG tablet, Take 10 mg by mouth., Disp: , Rfl:     famotidine (PEPCID) 20 MG tablet, Take 40 mg by mouth., Disp: , Rfl:     fluticasone propionate (FLONASE) 50 mcg/actuation nasal spray, 2 sprays by Each Nostril route once daily., Disp: , Rfl:     gabapentin (NEURONTIN) 600 MG tablet, , Disp: , Rfl:     haloperidoL (HALDOL) 2 MG tablet, Take 2 mg by mouth every 6 (six) hours as needed., Disp: , Rfl:     loperamide (IMODIUM) 2 mg capsule, SMARTSI Capsule(s) By Mouth Every 2 Hours PRN,  Disp: , Rfl:     MAG-AL PLUS EXTRA STRENGTH 400-400-40 mg/5 mL suspension, SMARTSI Milliliter(s) By Mouth Every 4 Hours PRN, Disp: , Rfl:     methocarbamoL (ROBAXIN) 500 MG Tab, Take 500 mg by mouth every 8 (eight) hours., Disp: , Rfl:     methylPREDNISolone (MEDROL DOSEPACK) 4 mg tablet, Take by mouth., Disp: , Rfl:     MILK OF MAGNESIA 400 mg/5 mL suspension, SMARTSI Milliliter(s) By Mouth Daily PRN, Disp: , Rfl:     nitroGLYCERIN (NITROSTAT) 0.4 MG SL tablet, , Disp: , Rfl:     omeprazole (PRILOSEC) 40 MG capsule, 1 capsule, Disp: , Rfl:     ondansetron (ZOFRAN-ODT) 4 MG TbDL, Take 4 mg by mouth every 4 (four) hours as needed., Disp: , Rfl:     predniSONE (DELTASONE) 20 MG tablet, Take by mouth., Disp: , Rfl:     pregabalin (LYRICA) 150 MG capsule, Take 150 mg by mouth 3 (three) times daily., Disp: , Rfl:     promethazine-dextromethorphan (PROMETHAZINE-DM) 6.25-15 mg/5 mL Syrp, TAKE 5ml BY MOUTH EVERY 4 TO 6 HOURS AS NEEDED FOR COUGH, Disp: , Rfl:     traZODone (DESYREL) 50 MG tablet, Take 25 mg by mouth nightly as needed., Disp: , Rfl:     VISTARIL 50 mg Cap, Take 50 mg by mouth every 6 (six) hours as needed., Disp: , Rfl:      Review of Systems: See HPI    Allergies:  Orange and Tomato     Medical History:   has a past medical history of Worthy's esophagus, Bowel and bladder incontinence, CKD (chronic kidney disease), Coronary artery disease, Diabetes mellitus type I, Drug-seeking behavior, GERD (gastroesophageal reflux disease), Hernia of abdominal wall, History of coronary angioplasty with insertion of stent, History of ST elevation myocardial infarction (STEMI), Hyperlipidemia, Hypertension, Ileal pouchitis, Insomnia, Onychomycosis, Right knee pain, Sleep apnea, unspecified, Smoker, and Ventral hernia.    Surgical History:   has a past surgical history that includes heart stents; Colonoscopy; Colonoscopy (N/A, 2020); Robotic Ventral Hernia Repair (x2), Extensive Lysis of Dense Adhesions;  "Colostomy; and Laparoscopic proctocolectomy with ileoanal anastomosis, creation of ileal pouch, and ileostomy.    Family History:  family history includes Arthritis in his mother; Diabetes in his mother; Heart disease in his mother; Hypertension in his father and mother; Stroke in his father.    Social History:   reports that he quit smoking about 17 years ago. His smoking use included cigarettes. He has never used smokeless tobacco. He reports current alcohol use. He reports current drug use. Drug: Marijuana.    Physical Exam:  /82   Pulse 71   Ht 6' 3" (1.905 m)   Wt 78.5 kg (173 lb)   BMI 21.62 kg/m²   GEN: No acute distress. Calm, comfortable  HENT: Normocephalic, atraumatic, moist mucous membranes  EYE: Anicteric sclera, non-injected.   CV: Non-diaphoretic. Regular Rate. Radial Pulses 2+.  RESP: Breathing comfortably. Chest expansion symmetric.  EXT: No clubbing, cyanosis.   SKIN: Warm, & dry to palpation. No visible rashes or lesions of exposed skin.   PSYCH: Pleasant mood and appropriate affect. Recent and remote memory intact.   GAIT: Independent, normal ambulation  Lumbar Spine Exam:       Inspection: No erythema, bruising.       Palpation: (+) TTP of lumbar paraspinals diffuse and b/l and SIJ b/l       ROM:  Limited in flexion, extension, lateral bending.       (+) Facet loading bilaterally      (+) Straight Leg Raise on the right      (+) MIKIE bilaterally  Hip Exam:      Inspection: No gross deformity or apparent leg length discrepancy      Palpation: (+) TTP to right greater trochanteric bursa.       ROM:  No limitation or pain in internal rotation, external rotation b/l  Neurologic Exam: (+) Suzette's signs with pain recreated and anticipated even prior to reflex testing     Alert. Speech is fluent and appropriate.     Strength:  4/5 throughout bilateral lower extremities     Sensation:  Grossly intact to light touch in bilateral lower extremities     Reflexes: Absent (patient unable to " relax) in b/l patella, achilles     Tone: No abnormality appreciated in bilateral lower extremities     No Clonus     Downgoing toes on plantar stimulation           Imaging:  - MRI Lumbar spine w/o 8/25/23:  Lumbar vertebrae heights and alignment appear maintained. There is no suspicious marrow signal. Minimal retrolisthesis noted of L3 on L4. Conus and cauda equina are without acute finding.  T12-L1: No spinal stenosis or foraminal narrowing.  L1-2: No significant canal or foraminal stenosis.  L2-3: Disc desiccation and diffuse disc bulge without spinal stenosis. Moderate facet DJD without foraminal narrowing.  L3-4: Disc desiccation and diffuse disc bulge with mild spinal stenosis. There is lateral recess stenosis. Moderate facet DJD. Mild bilateral foraminal narrowing.  L4-5: Disc desiccation and diffuse disc bulge with moderate spinal stenosis and lateral recess stenosis. Moderate facet DJD. There is a superimposed right foraminal/extra foraminal protrusion resulting in mass effect upon the nerve root. There is moderate right foraminal narrowing. There is mild left foraminal narrowing.  L5-S1: Disc desiccation and diffuse disc bulge with no spinal stenosis. Advanced facet DJD. No spinal stenosis or foraminal narrowing.  Mild SI joint DJD.  IMPRESSION:  1. Moderate lumbar DJD with multilevel spinal stenosis and multilevel foraminal narrowing as described in detail level by level above. Findings are most significant at L4-5.    - CT Lumbar spine 6/26/23:  Alignment of the lumbar spine is within normal limits.  There is no fracture or malalignment. Vertebral body heights are normal.  There are moderate degenerative changes in the mid and lower lumbar spine.  Disc bulges are present at at least L3-L4, L4-L5, and L5-S1.  No obvious canal stenosis. There are probable bilateral foraminal stenoses at L4-L5 and L5-S1, likely also due to L4  The paraspinal soft tissues appear normal.  IMPRESSION:  1. No acute  "findings  2. Multilevel disc degeneration and hypertrophic facet arthropathy with probable foraminal stenoses bilaterally at L3-L4, L4-L5, and L5-S1      Labs:  BMP  Lab Results   Component Value Date     10/23/2019    K 4.3 10/23/2019     (H) 10/23/2019    CO2 28 10/23/2019    BUN 11 10/23/2019    CREATININE 1.17 10/23/2019    CALCIUM 9.4 10/23/2019    ANIONGAP 3 (L) 10/23/2019     No results found for: "ALT", "AST", "GGT", "ALKPHOS", "BILITOT"  No results found for: "PLT"    Assessment:  Roger Ge is a 58 y.o. male with the following diagnoses based on history, exam, and imaging:    Problem List Items Addressed This Visit          Neuro    Lumbar radiculopathy - Primary    Relevant Orders    Ambulatory referral/consult to Physical/Occupational Therapy    Lumbar spondylosis    Relevant Orders    Ambulatory referral/consult to Physical/Occupational Therapy    Decreased range of motion of lumbar spine    Relevant Orders    Ambulatory referral/consult to Physical/Occupational Therapy       GI    History of ulcerative colitis    Relevant Orders    Ambulatory referral/consult to Gastroenterology         This is a pleasant 58 y.o. gentleman presenting with:     - Chronic bilateral low back pain and right leg pain: Multifactorial pain, but suspect primarily due to HNP at L4 pushing on right L4 nerve root  - Right trochanteric bursitis  - Widespread pain/Polyarthralgia: Potentially related to his IBD, but patient was late to visit today and much of visit spent trying to organize his current medical problems and medications.   - Poor historian: Unclear of his current medications and his specific medical diagnoses/problems. I advised him to try to write down all his current meds, how he is taking them and his other medical problems.   - Comorbidities: Ulcerative colitis. Bowel and bladder incontinence. CAD c/b MI s/p PCI on ASA. HTN. GERD. CKD. Depression.    - ? DM2 ? (On referral note but not taking any " meds for DM2).     Treatment Plan:   - PT/OT/HEP: Re-refer to PT to start after WILBER. Again, provided HNP HEP and encouraged HEP. Discussed benefits of exercise for pain.   - Procedures: Schedule L4-5 IL WILBER favoring right side with local/MAC. ASA ok to cont.    - If limited relief with both above, plan for caudal WILBER  - Medications:   - Unclear if he is taking gabapentin or lyrica or both. Referral says gabapentin on med list, but  shows he last filled gabapentin and lyrica 7/11/23.   - Imaging: Reviewed.   - Labs: Reviewed.    - Reviewed records from Dr. Segundo  - Re-Refer to local GI for management of IBD. He is currently traveling to [place].     Follow Up: RTC 2 weeks after WILBER      Nicole Osuna M.D.  Interventional Pain Medicine / Physical Medicine & Rehabilitation

## 2024-01-19 DIAGNOSIS — M54.16 LUMBAR RADICULOPATHY: Primary | ICD-10-CM

## 2024-01-30 ENCOUNTER — TELEPHONE (OUTPATIENT)
Dept: SURGERY | Facility: CLINIC | Age: 59
End: 2024-01-30
Payer: MEDICARE

## 2024-01-30 NOTE — TELEPHONE ENCOUNTER
Patient's insurance will go back to [facility] Medicare replacement on 2/1. Contact information updated in chart.

## 2024-01-30 NOTE — TELEPHONE ENCOUNTER
----- Message from Shawna Garcia sent at 1/30/2024  1:33 PM CST -----  Patient is returning call please call him back [phone].          Thanks  brett

## 2024-02-22 ENCOUNTER — OFFICE VISIT (OUTPATIENT)
Dept: PAIN MEDICINE | Facility: CLINIC | Age: 59
End: 2024-02-22
Payer: MEDICARE

## 2024-02-22 ENCOUNTER — DOCUMENTATION ONLY (OUTPATIENT)
Dept: PAIN MEDICINE | Facility: CLINIC | Age: 59
End: 2024-02-22

## 2024-02-22 VITALS
HEIGHT: 75 IN | BODY MASS INDEX: 21.51 KG/M2 | WEIGHT: 173 LBS | HEART RATE: 60 BPM | DIASTOLIC BLOOD PRESSURE: 74 MMHG | SYSTOLIC BLOOD PRESSURE: 121 MMHG

## 2024-02-22 DIAGNOSIS — R07.9 CHEST PAIN, UNSPECIFIED TYPE: Primary | ICD-10-CM

## 2024-02-22 PROCEDURE — 1160F RVW MEDS BY RX/DR IN RCRD: CPT | Mod: CPTII,S$GLB,, | Performed by: PHYSICAL MEDICINE & REHABILITATION

## 2024-02-22 PROCEDURE — 3008F BODY MASS INDEX DOCD: CPT | Mod: CPTII,S$GLB,, | Performed by: PHYSICAL MEDICINE & REHABILITATION

## 2024-02-22 PROCEDURE — 3074F SYST BP LT 130 MM HG: CPT | Mod: CPTII,S$GLB,, | Performed by: PHYSICAL MEDICINE & REHABILITATION

## 2024-02-22 PROCEDURE — 99213 OFFICE O/P EST LOW 20 MIN: CPT | Mod: S$GLB,,, | Performed by: PHYSICAL MEDICINE & REHABILITATION

## 2024-02-22 PROCEDURE — 3078F DIAST BP <80 MM HG: CPT | Mod: CPTII,S$GLB,, | Performed by: PHYSICAL MEDICINE & REHABILITATION

## 2024-02-22 PROCEDURE — 1159F MED LIST DOCD IN RCRD: CPT | Mod: CPTII,S$GLB,, | Performed by: PHYSICAL MEDICINE & REHABILITATION

## 2024-02-22 NOTE — PROGRESS NOTES
"[facility]        Chief Complaint:   Chief Complaint   Patient presents with    Low-back Pain       History of Present Illness: Roger Ge is a 59 y.o. male referred by Dr. Adryan Jensen for lower back and joint pain.      Onset: 2006, insidious onset but worked hard his whole life  Location: bilateral lower back, worse on the right   Radiation: Right leg across the anterior knee into the right foot.   Timing: constant  Quality: Throbbing, Pounding, Burning, Tingling, Pins/Needles, Sharp, and Stabbing  Exacerbating Factors: exercise, running, walking, standing, sitting, lying down, lifting, twisting, and turning  Alleviating Factors: nothing  Associated Symptoms: He has weakness and numbness in the right leg. He has incontinence of bowels, had ilial pouch created for ulcerative colitis. He has incontinence of his bladder as well. He is losing weight. He denies night fever/night sweats    He tried to have home health therapy, but pain was too severe to be able to do it. He says he takes gabapentin and lyrica, but last filled July 2023 and PCP notes he stopped taking both. PCP note states he is using EtOH for pain.     Severity: Currently: 8/10   Typical Range: 8-9/10     Exacerbation: 10/10     P = 8  E = 10  G = 10  Baseline PEG Score = 9.33    Interval History (01/18/2024):  Roger Ge returns today for follow up.  At the last clinic visit, scheduled right L4 & L5 TF WILBER, DC tizanidine, refer to PT after WILBER.     Right L4 & L5 TF WILBER  provided 0% relief. He did not do PT because no one called him, but he persists that he cannot do it due to the pain. He attempts home exercises that were given, but cannot do that either due to pain.     Currently, the lower back and right leg pain is worse.      Patient unsure of what medications he is currently taking. He does state that Dr. Goel gave him "something for anxiety and something for muscle spasms" last week. Advised pt to call office this " "afternoon to verify medication list.     Current Pain Scales:  Current: 8/10              Typical Range: 8-10/10     Interval History (02/22/2024):  Roger Ge returns today for follow up.  At the last clinic visit, re-refer to PT, Schedule L4-5 IL WILBER.    L4-5 IL WILBER was scheduled for 2/5/24, but patient was sent to ER due to chest pain. They recommended he go to a different hospital as there was no cardiology present for assessment. He was not able to do that.     Patient reports daily chest pain and the need for oral Nitroglycerin "almost every other day". Patient is followed by Dr Sgeundo, cardioligist, but cannot get an appointment for one month. He was instructed by cardiology to go to ED for his chest pain.     Patient states has not received a call to start PT.     Pt unknown current medications, we reached out to pharmacy to retrieve current med list to update.     Currently, the lower back pain is worse.      Current Pain Scales:  Current: 9/10              Typical Range: 7-10/10     Current PEG Score: 9.67    Opioid Risk Score         Value Time User    Opioid Risk Score  1 10/18/2023  8:44 AM Leti Arreaga MA             Previous Interventions:  -     Previous Therapies:  PT/OT: no   Relevant Surgery: no   Previous Medications:   - NSAIDS: diclofenac. Naproxen. Mobic. Ibuprofen. (Listed allergy to tylenol, but Rx-ed Hydrocodone-APAP several times in past per )  - Muscle Relaxants: Flexeril   - TCAs:   - SNRIs:   - Topicals:   - Anticonvulsants: gabapentin. Pregabalin.    - Opioids:     Current Pain Medications:  He is unclear what he is taking      Blood Thinners: ASA 81 mg     Full Medication List:    Current Outpatient Medications:     amLODIPine (NORVASC) 5 MG tablet, Take 5 mg by mouth., Disp: , Rfl:     aspirin (ECOTRIN) 81 MG EC tablet, 1 tablet, Disp: , Rfl:     atorvastatin (LIPITOR) 80 MG tablet, 1 tablet, Disp: , Rfl:     BELSOMRA 20 mg Tab, Take by mouth., Disp: , Rfl:     cloNIDine " (CATAPRES) 0.1 MG tablet, Take 0.1 mg by mouth every 4 (four) hours as needed., Disp: , Rfl:     cyclobenzaprine (FLEXERIL) 10 MG tablet, SMARTSI Tablet(s) By Mouth 3 Times Daily PRN, Disp: , Rfl:     dicyclomine (BENTYL) 20 mg tablet, , Disp: , Rfl:     diphenhydrAMINE (BENADRYL) 25 mg capsule, Take 25 mg by mouth every 6 (six) hours as needed., Disp: , Rfl:     EScitalopram oxalate (LEXAPRO) 10 MG tablet, Take 10 mg by mouth., Disp: , Rfl:     famotidine (PEPCID) 20 MG tablet, Take 40 mg by mouth., Disp: , Rfl:     fluticasone propionate (FLONASE) 50 mcg/actuation nasal spray, 2 sprays by Each Nostril route once daily., Disp: , Rfl:     gabapentin (NEURONTIN) 600 MG tablet, , Disp: , Rfl:     haloperidoL (HALDOL) 2 MG tablet, Take 2 mg by mouth every 6 (six) hours as needed., Disp: , Rfl:     loperamide (IMODIUM) 2 mg capsule, SMARTSI Capsule(s) By Mouth Every 2 Hours PRN, Disp: , Rfl:     MAG-AL PLUS EXTRA STRENGTH 400-400-40 mg/5 mL suspension, SMARTSI Milliliter(s) By Mouth Every 4 Hours PRN, Disp: , Rfl:     methocarbamoL (ROBAXIN) 500 MG Tab, Take 500 mg by mouth every 8 (eight) hours., Disp: , Rfl:     methylPREDNISolone (MEDROL DOSEPACK) 4 mg tablet, Take by mouth., Disp: , Rfl:     MILK OF MAGNESIA 400 mg/5 mL suspension, SMARTSI Milliliter(s) By Mouth Daily PRN, Disp: , Rfl:     nitroGLYCERIN (NITROSTAT) 0.4 MG SL tablet, , Disp: , Rfl:     omeprazole (PRILOSEC) 40 MG capsule, 1 capsule, Disp: , Rfl:     ondansetron (ZOFRAN-ODT) 4 MG TbDL, Take 4 mg by mouth every 4 (four) hours as needed., Disp: , Rfl:     predniSONE (DELTASONE) 20 MG tablet, Take by mouth., Disp: , Rfl:     pregabalin (LYRICA) 150 MG capsule, Take 150 mg by mouth 3 (three) times daily., Disp: , Rfl:     promethazine-dextromethorphan (PROMETHAZINE-DM) 6.25-15 mg/5 mL Syrp, TAKE 5ml BY MOUTH EVERY 4 TO 6 HOURS AS NEEDED FOR COUGH, Disp: , Rfl:     traZODone (DESYREL) 50 MG tablet, Take 25 mg by mouth nightly as needed.,  "Disp: , Rfl:     VISTARIL 50 mg Cap, Take 50 mg by mouth every 6 (six) hours as needed., Disp: , Rfl:      Review of Systems: See HPI    Allergies:  Orange and Tomato     Medical History:   has a past medical history of Worthy's esophagus, Bowel and bladder incontinence, CKD (chronic kidney disease), Coronary artery disease, Diabetes mellitus type I, Drug-seeking behavior, GERD (gastroesophageal reflux disease), Hernia of abdominal wall, History of coronary angioplasty with insertion of stent, History of ST elevation myocardial infarction (STEMI), Hyperlipidemia, Hypertension, Ileal pouchitis, Insomnia, Onychomycosis, Right knee pain, Sleep apnea, unspecified, Smoker, and Ventral hernia.    Surgical History:   has a past surgical history that includes heart stents; Colonoscopy; Colonoscopy (N/A, 08/06/2020); Robotic Ventral Hernia Repair (x2), Extensive Lysis of Dense Adhesions; Colostomy; and Laparoscopic proctocolectomy with ileoanal anastomosis, creation of ileal pouch, and ileostomy.    Family History:  family history includes Arthritis in his mother; Diabetes in his mother; Heart disease in his mother; Hypertension in his father and mother; Stroke in his father.    Social History:   reports that he quit smoking about 17 years ago. His smoking use included cigarettes. He has never used smokeless tobacco. He reports current alcohol use. He reports current drug use. Drug: Marijuana.    Physical Exam:  /74   Pulse 60   Ht 6' 3" (1.905 m)   Wt 78.5 kg (173 lb)   BMI 21.62 kg/m²   GEN: No acute distress. Calm, comfortable  HENT: Normocephalic, atraumatic, moist mucous membranes  EYE: Anicteric sclera, non-injected.   CV: Non-diaphoretic. Regular Rate. Radial Pulses 2+.  RESP: Breathing comfortably. Chest expansion symmetric.  EXT: No clubbing, cyanosis.   SKIN: Warm, & dry to palpation. No visible rashes or lesions of exposed skin.   PSYCH: Pleasant mood and appropriate affect. Recent and remote memory " intact.   GAIT: Independent, normal ambulation  Lumbar Spine Exam:       Inspection: No erythema, bruising.       Palpation: (+) TTP of lumbar paraspinals diffuse and b/l and SIJ b/l       ROM:  Limited in flexion, extension, lateral bending.       (+) Facet loading bilaterally      (+) Straight Leg Raise on the right      (+) MIKIE bilaterally  Hip Exam:      Inspection: No gross deformity or apparent leg length discrepancy      Palpation: (+) TTP to right greater trochanteric bursa.       ROM:  No limitation or pain in internal rotation, external rotation b/l  Neurologic Exam: (+) Suzette's signs with pain recreated and anticipated even prior to reflex testing     Alert. Speech is fluent and appropriate.     Strength:  4/5 throughout bilateral lower extremities     Sensation:  Grossly intact to light touch in bilateral lower extremities     Reflexes: Absent (patient unable to relax) in b/l patella, achilles     Tone: No abnormality appreciated in bilateral lower extremities     No Clonus     Downgoing toes on plantar stimulation           Imaging:  - MRI Lumbar spine w/o 8/25/23:  Lumbar vertebrae heights and alignment appear maintained. There is no suspicious marrow signal. Minimal retrolisthesis noted of L3 on L4. Conus and cauda equina are without acute finding.  T12-L1: No spinal stenosis or foraminal narrowing.  L1-2: No significant canal or foraminal stenosis.  L2-3: Disc desiccation and diffuse disc bulge without spinal stenosis. Moderate facet DJD without foraminal narrowing.  L3-4: Disc desiccation and diffuse disc bulge with mild spinal stenosis. There is lateral recess stenosis. Moderate facet DJD. Mild bilateral foraminal narrowing.  L4-5: Disc desiccation and diffuse disc bulge with moderate spinal stenosis and lateral recess stenosis. Moderate facet DJD. There is a superimposed right foraminal/extra foraminal protrusion resulting in mass effect upon the nerve root. There is moderate right foraminal  "narrowing. There is mild left foraminal narrowing.  L5-S1: Disc desiccation and diffuse disc bulge with no spinal stenosis. Advanced facet DJD. No spinal stenosis or foraminal narrowing.  Mild SI joint DJD.  IMPRESSION:  1. Moderate lumbar DJD with multilevel spinal stenosis and multilevel foraminal narrowing as described in detail level by level above. Findings are most significant at L4-5.    - CT Lumbar spine 6/26/23:  Alignment of the lumbar spine is within normal limits.  There is no fracture or malalignment. Vertebral body heights are normal.  There are moderate degenerative changes in the mid and lower lumbar spine.  Disc bulges are present at at least L3-L4, L4-L5, and L5-S1.  No obvious canal stenosis. There are probable bilateral foraminal stenoses at L4-L5 and L5-S1, likely also due to L4  The paraspinal soft tissues appear normal.  IMPRESSION:  1. No acute findings  2. Multilevel disc degeneration and hypertrophic facet arthropathy with probable foraminal stenoses bilaterally at L3-L4, L4-L5, and L5-S1      Labs:  BMP  Lab Results   Component Value Date     10/23/2019    K 4.3 10/23/2019     (H) 10/23/2019    CO2 28 10/23/2019    BUN 11 10/23/2019    CREATININE 1.17 10/23/2019    CALCIUM 9.4 10/23/2019    ANIONGAP 3 (L) 10/23/2019     No results found for: "ALT", "AST", "GGT", "ALKPHOS", "BILITOT"  No results found for: "PLT"    Assessment:  Roger Ge is a 59 y.o. male with the following diagnoses based on history, exam, and imaging:    Problem List Items Addressed This Visit    None  Visit Diagnoses       Chest pain, unspecified type    -  Primary                This is a pleasant 59 y.o. gentleman presenting with:     - Chronic bilateral low back pain and right leg pain: Multifactorial pain, but suspect primarily due to HNP at L4 pushing on right L4 nerve root  - Right trochanteric bursitis  - Widespread pain/Polyarthralgia: Potentially related to his IBD, but patient was late to visit " today and much of visit spent trying to organize his current medical problems and medications.   - Poor historian: Unclear of his current medications and his specific medical diagnoses/problems. I advised him to try to write down all his current meds, how he is taking them and his other medical problems.   - Chest pain:   - Patient needs evaluation for chest pain prior to any further intervention with us or doing PT.  - Comorbidities: Ulcerative colitis. Bowel and bladder incontinence. CAD c/b MI s/p PCI on ASA. HTN. GERD. CKD. Depression.    - ? DM2 ? (On referral note but not taking any meds for DM2).     Treatment Plan:   - PT/OT/HEP: Do not start PT until being assessed for his chest pain.   - Procedures: No procedure until assessed by cardiology.   - Plan for L4-5 IL WILBER favoring right side with local/MAC. ASA ok to cont.    - If limited relief with both above, plan for caudal WILBER  - Medications:   - Unclear if he is taking gabapentin or lyrica or both. Referral says gabapentin on med list, but  shows he last filled gabapentin and lyrica 7/11/23.   - Imaging: Reviewed.   - Labs: Reviewed.    - Sent to ED again today for chest pain    Follow Up: RTC after being assessed by cardiology      Nicole Osuna M.D.  Interventional Pain Medicine / Physical Medicine & Rehabilitation

## 2024-02-22 NOTE — PROGRESS NOTES
"  Patient presented to office today for follow up after scheduled L4-5 IL WILBER which was scheduled for 2/5/24, but on that day, patient was sent to ER by provider (Dr Osuna) instead of having the procedure done due to patient having chest pain.     Mr Ge expressed that on 2/5/24 when he was sent to ER, several tests were done including an EKG, as well as physical exam. He also reports that the ER physician's treatment plan consisted of him being transferred to [facility] (due to pt needing cardiologist) for direct admission. Patient voiced that he was not able to be admitted and/or transferred due to "being a single parent and needing get his 7 year old daughter off the bus after school".     Patient does live in [place], [place] and uses public transportation service that is provided by his insurance to commute to and from medical appointments.     Upon triage for today's appointment to see Dr Osuna, patient informed me that he has chest pain daily and the need for oral Nitroglycerin "almost every other day". Patient is followed by Dr Segundo, cardioligist, but states he "cannot get an appointment there for one month".     Per Dr Osuna, due to the patient's hx and current symptom of chest pain, patient was advised to go to [facility] Emergency Room now instead of being seen in office today. I called the ER and spoke with nurse Mistry to inform that someone from our office would be bringing Mr Ge to the Emergency dept via wheelchair. Ms. Mistry (nurse) verbalized understanding.     Before discharging patient from clinic, I called Dr Segundo's office in an attempt to get a sooner appointment for Mr Ge. We were able to speak with the nurseJuanpablo Alonzo who stated that Dr Segundo was not in the office, but she would speak with his NP, Ms Cordova, for further recommendations.     Ms Cordova, NP advised that patient should start Metoprolol Tart 25mg bid (she called new rx in to pt's pharmacy) and he " "is to call Dr Segundo's office in 2-3days if no relief with this additional medication. She also advised that patient should still go to the emergency room at this time.     After making notation of Ms Cordova, ABRIL and Dr Osuna's verbal orders/recommendations, I informed Mr Ge of the above. Patient verbalized that he "does not want to go back to the emergency room". I stressed the importance of this plan of care and offered to get wheelchair to help assist him to get to emergency department; patient remained persistent and voiced that he would rather not go.     Patient sat in lobby for about 30 minutes before leaving the clinic.   "

## 2024-03-06 ENCOUNTER — TELEPHONE (OUTPATIENT)
Dept: PAIN MEDICINE | Facility: CLINIC | Age: 59
End: 2024-03-06
Payer: MEDICARE

## 2024-03-06 NOTE — TELEPHONE ENCOUNTER
----- Message from Sonia Acosta sent at 3/6/2024  8:26 AM CST -----  Contact: self  Type:  Needs Medical Advice    Who Called:  Roger Ge   Symptoms (please be specific):  Trouble getting out of the bed, trouble walking, using a wheelchair, feels like a pulling in his lower back   How long has patient had these symptoms:  Started Monday and says it's getting worse  Pharmacy name and phone #:    [facility] - [place] - [address]  [address]  [facility] [address]  Phone: [phone] Fax: [phone]     Would the patient rather a call back or a response via [facility]? Call back  Best Call Back Number:  [phone]   Additional Information: He wants to know if he can be seen this Friday--he has another appt with a heart doctor at 8am and can probably come after that if Dr Nicole Osuna wants to see him. Please let him know, because he'll have to let transportation know.

## 2024-03-06 NOTE — TELEPHONE ENCOUNTER
Per pt, he is having some severe back pain, that is causing him to have to use walker and wheelchair to ambulate.     Pt uses public transportation from [place] and would like to know if you would like to see him on Friday, after his Cardiology appt?

## 2024-03-06 NOTE — TELEPHONE ENCOUNTER
Returned patient call no answer voicemail left asking for Mr. Ge to return my call office number provided.

## 2024-03-06 NOTE — TELEPHONE ENCOUNTER
----- Message from Demarco Parks sent at 3/6/2024  1:31 PM CST -----  Contact: Roger  .Type:  Patient Returning Call    Who Called: Roger  Who Left Message for Patient: Gabbie  Does the patient know what this is regarding?: Yes   Would the patient rather a call back or a response via [facility]?  Call back   Best Call Back Number: [phone]  Additional Information:      Thanks

## 2024-03-07 NOTE — TELEPHONE ENCOUNTER
Called pt, informed of Dr Osuna's recommendations. Pt verbalized understanding. Will call us back after cardiology appt with an update in care.

## 2024-03-11 ENCOUNTER — TELEPHONE (OUTPATIENT)
Dept: PAIN MEDICINE | Facility: CLINIC | Age: 59
End: 2024-03-11
Payer: MEDICARE

## 2024-03-11 NOTE — TELEPHONE ENCOUNTER
FYI : Mr. Ge returned our call to let us know he was seen per  on 3/8/24 he sent him to [facility] ER to be evaluated pt was d/c home the same day in stable condition rx written for Ranexa 500 mg, also pt will follow 3/20/24 in [place] with Dr Beard (gen surgery) who did a colon surgery a couple of years ago.          Dr. Lenny Beard   Phone # [phone]

## 2024-04-01 ENCOUNTER — TELEPHONE (OUTPATIENT)
Dept: PAIN MEDICINE | Facility: CLINIC | Age: 59
End: 2024-04-01
Payer: MEDICARE

## 2024-04-01 NOTE — TELEPHONE ENCOUNTER
----- Message from Patricia Quiroga sent at 4/1/2024  8:57 AM CDT -----  Contact: self  Type:  Patient Returning Call    Who Called:Roger Ge  Who Left Message for Patient:unsure  Does the patient know what this is regarding?:on going pain/hosp f/u  Would the patient rather a call back or a response via [facility]?   Best Call Back Number:[phone]  Additional Information:   [facility] - [place] - [address]  [address]  [place] [address]  Phone: [phone] Fax: [phone]

## 2024-04-01 NOTE — TELEPHONE ENCOUNTER
"Called pt, he stated that he is in severe pain and would like to know if Dr Osuna would send something out to the [facility].     Per patient, he went to the ER and was admitted over the weekend. Patient states he was told that he has "cysts on his pancreas" and is in need of a heart surgery.     Please advise.  "

## 2024-04-04 NOTE — TELEPHONE ENCOUNTER
Called pt to inform of Dr Osuna's recommendations, no answer when calling. Will attempt to call again.